# Patient Record
Sex: MALE | Race: WHITE | Employment: FULL TIME | URBAN - METROPOLITAN AREA
[De-identification: names, ages, dates, MRNs, and addresses within clinical notes are randomized per-mention and may not be internally consistent; named-entity substitution may affect disease eponyms.]

---

## 2017-03-27 PROBLEM — I10 ESSENTIAL HYPERTENSION: Status: ACTIVE | Noted: 2017-03-27

## 2020-06-24 PROBLEM — G47.33 OBSTRUCTIVE SLEEP APNEA: Status: ACTIVE | Noted: 2020-06-24

## 2020-11-02 ENCOUNTER — HOSPITAL ENCOUNTER (EMERGENCY)
Age: 50
Discharge: HOME | End: 2020-11-02
Attending: EMERGENCY MEDICINE
Payer: COMMERCIAL

## 2020-11-02 ENCOUNTER — APPOINTMENT (OUTPATIENT)
Dept: CT IMAGING | Age: 50
End: 2020-11-02
Payer: COMMERCIAL

## 2020-11-02 VITALS
WEIGHT: 274 LBS | SYSTOLIC BLOOD PRESSURE: 128 MMHG | HEART RATE: 82 BPM | RESPIRATION RATE: 17 BRPM | DIASTOLIC BLOOD PRESSURE: 89 MMHG | TEMPERATURE: 98.5 F | BODY MASS INDEX: 39.31 KG/M2 | OXYGEN SATURATION: 95 %

## 2020-11-02 LAB
ANION GAP SERPL CALCULATED.3IONS-SCNC: 12 MMOL/L (ref 7–16)
BACTERIA: ABNORMAL /HPF
BASOPHILS ABSOLUTE: 0.1 E9/L (ref 0–0.2)
BASOPHILS RELATIVE PERCENT: 0.8 % (ref 0–2)
BILIRUBIN URINE: NEGATIVE
BLOOD, URINE: ABNORMAL
BUN BLDV-MCNC: 18 MG/DL (ref 6–20)
CALCIUM SERPL-MCNC: 9.3 MG/DL (ref 8.6–10.2)
CHLORIDE BLD-SCNC: 102 MMOL/L (ref 98–107)
CLARITY: CLEAR
CO2: 23 MMOL/L (ref 22–29)
COLOR: YELLOW
CREAT SERPL-MCNC: 0.9 MG/DL (ref 0.7–1.2)
CRYSTALS, UA: ABNORMAL /HPF
EOSINOPHILS ABSOLUTE: 0.09 E9/L (ref 0.05–0.5)
EOSINOPHILS RELATIVE PERCENT: 0.7 % (ref 0–6)
GFR AFRICAN AMERICAN: >60
GFR NON-AFRICAN AMERICAN: >60 ML/MIN/1.73
GLUCOSE BLD-MCNC: 114 MG/DL (ref 74–99)
GLUCOSE URINE: NEGATIVE MG/DL
HCT VFR BLD CALC: 49.3 % (ref 37–54)
HEMOGLOBIN: 16.8 G/DL (ref 12.5–16.5)
IMMATURE GRANULOCYTES #: 0.08 E9/L
IMMATURE GRANULOCYTES %: 0.6 % (ref 0–5)
KETONES, URINE: NEGATIVE MG/DL
LEUKOCYTE ESTERASE, URINE: ABNORMAL
LYMPHOCYTES ABSOLUTE: 1.48 E9/L (ref 1.5–4)
LYMPHOCYTES RELATIVE PERCENT: 11.5 % (ref 20–42)
MCH RBC QN AUTO: 29.8 PG (ref 26–35)
MCHC RBC AUTO-ENTMCNC: 34.1 % (ref 32–34.5)
MCV RBC AUTO: 87.6 FL (ref 80–99.9)
MONOCYTES ABSOLUTE: 0.71 E9/L (ref 0.1–0.95)
MONOCYTES RELATIVE PERCENT: 5.5 % (ref 2–12)
NEUTROPHILS ABSOLUTE: 10.4 E9/L (ref 1.8–7.3)
NEUTROPHILS RELATIVE PERCENT: 80.9 % (ref 43–80)
NITRITE, URINE: POSITIVE
PDW BLD-RTO: 12.8 FL (ref 11.5–15)
PH UA: 8.5 (ref 5–9)
PLATELET # BLD: 210 E9/L (ref 130–450)
PMV BLD AUTO: 10.1 FL (ref 7–12)
POTASSIUM SERPL-SCNC: 4.6 MMOL/L (ref 3.5–5)
PROTEIN UA: 30 MG/DL
RBC # BLD: 5.63 E12/L (ref 3.8–5.8)
RBC UA: ABNORMAL /HPF (ref 0–2)
SODIUM BLD-SCNC: 137 MMOL/L (ref 132–146)
SPECIFIC GRAVITY UA: 1.01 (ref 1–1.03)
UROBILINOGEN, URINE: 0.2 E.U./DL
WBC # BLD: 12.9 E9/L (ref 4.5–11.5)
WBC UA: ABNORMAL /HPF (ref 0–5)

## 2020-11-02 PROCEDURE — 74176 CT ABD & PELVIS W/O CONTRAST: CPT

## 2020-11-02 PROCEDURE — 6360000002 HC RX W HCPCS: Performed by: EMERGENCY MEDICINE

## 2020-11-02 PROCEDURE — 99292 CRITICAL CARE ADDL 30 MIN: CPT

## 2020-11-02 PROCEDURE — 99283 EMERGENCY DEPT VISIT LOW MDM: CPT

## 2020-11-02 PROCEDURE — 6360000002 HC RX W HCPCS: Performed by: STUDENT IN AN ORGANIZED HEALTH CARE EDUCATION/TRAINING PROGRAM

## 2020-11-02 PROCEDURE — 80048 BASIC METABOLIC PNL TOTAL CA: CPT

## 2020-11-02 PROCEDURE — 87186 SC STD MICRODIL/AGAR DIL: CPT

## 2020-11-02 PROCEDURE — 96374 THER/PROPH/DIAG INJ IV PUSH: CPT

## 2020-11-02 PROCEDURE — 81001 URINALYSIS AUTO W/SCOPE: CPT

## 2020-11-02 PROCEDURE — 96375 TX/PRO/DX INJ NEW DRUG ADDON: CPT

## 2020-11-02 PROCEDURE — 85025 COMPLETE CBC W/AUTO DIFF WBC: CPT

## 2020-11-02 PROCEDURE — 99291 CRITICAL CARE FIRST HOUR: CPT

## 2020-11-02 PROCEDURE — 2580000003 HC RX 258: Performed by: EMERGENCY MEDICINE

## 2020-11-02 PROCEDURE — 87088 URINE BACTERIA CULTURE: CPT

## 2020-11-02 RX ORDER — LABETALOL HYDROCHLORIDE 5 MG/ML
10 INJECTION, SOLUTION INTRAVENOUS ONCE
Status: DISCONTINUED | OUTPATIENT
Start: 2020-11-02 | End: 2020-11-02

## 2020-11-02 RX ORDER — CEFDINIR 300 MG/1
300 CAPSULE ORAL 2 TIMES DAILY
Qty: 14 CAPSULE | Refills: 0 | Status: SHIPPED | OUTPATIENT
Start: 2020-11-02 | End: 2020-11-03 | Stop reason: SDUPTHER

## 2020-11-02 RX ORDER — KETOROLAC TROMETHAMINE 30 MG/ML
15 INJECTION, SOLUTION INTRAMUSCULAR; INTRAVENOUS ONCE
Status: COMPLETED | OUTPATIENT
Start: 2020-11-02 | End: 2020-11-02

## 2020-11-02 RX ADMIN — KETOROLAC TROMETHAMINE 15 MG: 30 INJECTION, SOLUTION INTRAMUSCULAR; INTRAVENOUS at 12:20

## 2020-11-02 RX ADMIN — CEFTRIAXONE SODIUM 1 G: 1 INJECTION, POWDER, FOR SOLUTION INTRAMUSCULAR; INTRAVENOUS at 12:51

## 2020-11-02 ASSESSMENT — ENCOUNTER SYMPTOMS
NAUSEA: 0
SHORTNESS OF BREATH: 0
DIARRHEA: 0
CHEST TIGHTNESS: 0
ABDOMINAL PAIN: 0
SORE THROAT: 0
BACK PAIN: 0
VOMITING: 0
COLOR CHANGE: 0

## 2020-11-02 ASSESSMENT — PAIN SCALES - GENERAL
PAINLEVEL_OUTOF10: 4
PAINLEVEL_OUTOF10: 4

## 2020-11-02 ASSESSMENT — PAIN DESCRIPTION - ORIENTATION: ORIENTATION: LEFT

## 2020-11-02 ASSESSMENT — PAIN DESCRIPTION - PAIN TYPE: TYPE: ACUTE PAIN

## 2020-11-02 ASSESSMENT — PAIN DESCRIPTION - LOCATION: LOCATION: FLANK

## 2020-11-02 NOTE — ED PROVIDER NOTES
Yaz Lr is a 44 y.o. man with history of nephrolithiasis presents after going to his PCPs office this morning for left-sided flank pain, and UA was positive for RBCs. Onset of left-sided flank pain, was evening priority and associated with difficulty urinating, and dysuria. This morning he felt some pressure in his urethra while urinating and saw a \"stone-like mass\" in the toilet. Flank pain has been stable, 4 out of 10, sharp, last for several seconds at a time. Not taken anything for the pain. No fevers, chills, N/V, chest pain, shortness of breath. Patient has a medical history as listed below:   Past Medical History:   Diagnosis Date    Asthma     CHILDHOOD    Kidney stones     Migraine     Obesity      Patient Active Problem List    Diagnosis Date Noted    Obstructive sleep apnea 06/24/2020    Essential hypertension 03/27/2017    Migraine 04/18/2016    Nephrolithiasis 02/01/2016    Class 2 obesity due to excess calories without serious comorbidity with body mass index (BMI) of 38.0 to 38.9 in adult 02/01/2016       Review of Systems   Constitutional: Negative for activity change, chills and fatigue. HENT: Negative for congestion and sore throat. Respiratory: Negative for chest tightness and shortness of breath. Cardiovascular: Negative for chest pain, palpitations and leg swelling. Gastrointestinal: Negative for abdominal pain, diarrhea, nausea and vomiting. Genitourinary: Positive for decreased urine volume, dysuria, flank pain and hematuria. Negative for penile pain, penile swelling, scrotal swelling, testicular pain and urgency. Musculoskeletal: Negative for arthralgias and back pain. Skin: Negative for color change and pallor. Neurological: Negative for light-headedness and numbness. Psychiatric/Behavioral: Negative for agitation and confusion. Physical Exam  Vitals signs reviewed. Constitutional:       Appearance: Normal appearance.    HENT: Head: Normocephalic and atraumatic. Eyes:      Extraocular Movements: Extraocular movements intact. Pupils: Pupils are equal, round, and reactive to light. Cardiovascular:      Rate and Rhythm: Normal rate. Pulses: Normal pulses. Heart sounds: Normal heart sounds. Pulmonary:      Effort: Pulmonary effort is normal.      Breath sounds: Normal breath sounds. Abdominal:      General: Abdomen is flat. Bowel sounds are normal. There is no distension. Palpations: Abdomen is soft. Tenderness: There is no abdominal tenderness. There is right CVA tenderness and left CVA tenderness. There is no guarding. Skin:     Capillary Refill: Capillary refill takes less than 2 seconds. Neurological:      General: No focal deficit present. Mental Status: He is alert and oriented to person, place, and time. Psychiatric:         Mood and Affect: Mood normal.         Behavior: Behavior normal.        Procedures     MDM  Number of Diagnoses or Management Options  Calculus of left kidney: established and improving  Ureterocele: established and improving  Urinary tract infection with hematuria, site unspecified: established and improving  Diagnosis management comments: 57-year-old man with history of nephrolithiasis presents for left-sided flank pain. UA positive for RBCs, WBCs, crystals and CT abdomen pelvis revealed punctuate nonobstructing left renal calculus with no hydronephrosis, ureterocele. He was given Toradol in the ER, 1 dose of Rocephin his symptoms minimally improved with this. This likely his symptoms are 2/2 calculus left kidney, ureterocele, and UTI. Patient remained hemodynamically stable throughout his ED course and he was discharged home with a course of [address] for an uncomplicated UTI. Instructed to follow-up with his PCP within the week. Advised the patient to return for worsening of his symptoms, high fevers.        Amount and/or Complexity of Data Reviewed  Clinical lab tests: ordered and reviewed  Tests in the radiology section of CPT®: ordered and reviewed  Tests in the medicine section of CPT®: reviewed and ordered    Risk of Complications, Morbidity, and/or Mortality  Presenting problems: moderate  Diagnostic procedures: low  Management options: low    Critical Care  Total time providing critical care: > 105 minutes    Patient Progress  Patient progress: improved       CT ABDOMEN PELVIS WO CONTRAST   Final Result   Significant left-sided hydrocele with some fluid dissecting left inguinal   canal.  Recommend scrotal ultrasound. Significant diffuse fatty infiltration liver. Punctate nonobstructing left renal calculus. There is no hydronephrosis. Subtle dilatation of the very distal left ureter could rule related to   ureterocele. There is moderate diffuse bladder wall thickening. Significant left-sided colonic diverticulosis without diverticulitis. Multilevel canal stenosis in the upper and mid lumbar spine             Patient presents to the ED for evaluation. This patient was seen and evaluated with the attending. Work-up was performed with concerns for but not limited to Kidney stone, urinary tract infection    Patient continues to be non-toxic on re-evaluation. Findings were discussed with the patient and reasons to immediately return to the ED were articulated to them. They will follow-up with their PMD.      EKG: normal EKG, normal sinus rhythm.        Oscar Cervantes MD  Resident  11/02/20 [id number]

## 2020-11-02 NOTE — ED PROVIDER NOTES
ATTENDING PROVIDER ATTESTATION:     Quinn Salcedo presented to the emergency department for evaluation of Flank Pain (left side)   and was initially evaluated by the Medical Resident. See Original ED Note for H&P and ED course above. I have reviewed and discussed the case, including pertinent history (medical, surgical, family and social) and exam findings with the Medical Resident assigned to Quinn Salcedo. I have personally performed and/or participated in the history, exam, medical decision making, and procedures and agree with all pertinent clinical information and any additional changes or corrections are noted below in my assessment and plan. I have discussed this patient in detail with the resident, and provided the instruction and education,       I have reviewed my findings and recommendations with the assigned Medical Resident, Quinn Salcedo and members of family present at the time of disposition. I have performed a history and physical examination of this patient and directly supervised the resident caring for this patient      History of Present Illness:    Presents to the ED for right flank pain and concerns for kidney stone, beginning prior to arrival.  The complaint has been persistent, moderate in severity, and worsened by nothing. Patient is concerned he has a kidney stone. He reports he had left flank pain with some dysuria and hematuria. He reports that he passed what he thinks was a small kidney stone this morning. History of kidney stones in the past.  No fever but has UTI symptoms. He continues to have dysuria. He denies other complaints. He denies nausea or vomiting. No diarrhea. Still making urine.   He denies any testicular pain or swelling or tenderness        Review of Systems:   A complete review of systems was performed and pertinent positives and negatives are stated within HPI, all other systems reviewed and are negative.    --------------------------------------------- PAST HISTORY ---------------------------------------------  Past Medical History:  has a past medical history of Asthma, Kidney stones, Migraine, and Obesity. Past Surgical History:  has a past surgical history that includes Kidney stone surgery; Bladder surgery; and Tonsillectomy. Social History:  reports that he has never smoked. He has never used smokeless tobacco. He reports current alcohol use. He reports that he does not use drugs. Family History: family history includes Diabetes in his mother and sister; Heart Disease in his father and mother. Unless otherwise noted, family history is non contributory    The patients home medications have been reviewed. Allergies: Alupent [metaproterenol]      Physical Exam:  Constitutional/General: Alert and oriented x3  Head: Normocephalic and atraumatic  Eyes: PERRL, EOMI, sclera non icteric  Mouth: Oropharynx clear, handling secretions  Neck: Supple, full ROM, no stridor, no meningeal signs  Respiratory: Lungs clear to auscultation bilaterally, no wheezes, rales, or rhonchi. Not in respiratory distress  Cardiovascular:  Regular rate. Regular rhythm. No murmurs, no aortic murmurs, no gallops, no rubs. 2+ distal pulses. Equal extremity pulses. GI:  Abdomen Soft, Non tender, Non distended. No rebound, guarding, or rigidity. No pulsatile masses. Mild left CVA tenderness. Musculoskeletal: Moves all extremities x 4. Warm and well perfused,  no clubbing, no cyanosis, no edema. Palpable peripheral pulses  Integument: skin warm and dry. No rashes.    Neurologic: GCS 15, no focal deficits  Psychiatric: Normal Affect      I directly supervised any procedures performed by the resident and was present for the procedure including all critical portions of the procedure    I, Dr. Van Oseguera, am the primary provider of record    My Medical Decision Making:         UTI  No fever  Still making urine  Pain improved, nontoxic-appearing        1. Urinary tract infection with hematuria, site unspecified    2. Calculus of left kidney    3.  Ureterocele           Patricio Ruiz MD  11/02/20 2076

## 2020-11-02 NOTE — ED NOTES
FIRST PROVIDER CONTACT ASSESSMENT NOTE      Department of Emergency Medicine   ED  First Provider Note   11/2/20  10:42 AM EST    Chief Complaint: No chief complaint on file. History of Present Illness:    Thalia Grace is a 44 y.o. male who presents to the ED by private car for left flank pain, started this morning, felt like he passed kidney stone this morning. Focused Screening Exam:  Constitutional:  Alert, appears stated age and is in no distress. Left flank pain, clear breath sounds b/l.      *ALLERGIES*     Alupent [metaproterenol]     ED Triage Vitals [11/02/20 1032]   BP Temp Temp src Pulse Resp SpO2 Height Weight   -- 97.1 °F (36.2 °C) -- 119 16 96 % -- --        Initial Plan of Care:  Initiate Treatment-Testing, Proceed toTreatment Area When Bed Available for ED Attending/MLP to Continue Care    -----------------END OF FIRST PROVIDER CONTACT ASSESSMENT NOTE--------------  Electronically signed by TONIE Titus   DD: 11/2/20       Jc Stringer [id number]  11/02/20 1043

## 2020-11-04 LAB
ORGANISM: ABNORMAL
URINE CULTURE, ROUTINE: ABNORMAL

## 2022-01-24 PROBLEM — N40.1 BENIGN PROSTATIC HYPERPLASIA WITH URINARY OBSTRUCTION: Status: ACTIVE | Noted: 2022-01-24

## 2022-01-24 PROBLEM — N13.8 BENIGN PROSTATIC HYPERPLASIA WITH URINARY OBSTRUCTION: Status: ACTIVE | Noted: 2022-01-24

## 2022-01-25 PROBLEM — R73.03 PREDIABETES: Status: ACTIVE | Noted: 2022-01-25

## 2022-01-25 PROBLEM — N40.0 BENIGN PROSTATIC HYPERPLASIA WITHOUT LOWER URINARY TRACT SYMPTOMS: Status: ACTIVE | Noted: 2022-01-24

## 2022-05-05 DIAGNOSIS — R30.0 DYSURIA: ICD-10-CM

## 2022-12-12 PROBLEM — K76.0 NAFL (NONALCOHOLIC FATTY LIVER): Status: ACTIVE | Noted: 2022-12-12

## 2023-01-07 ENCOUNTER — HOSPITAL ENCOUNTER (OUTPATIENT)
Dept: ULTRASOUND IMAGING | Age: 53
End: 2023-01-07
Payer: COMMERCIAL

## 2023-01-07 DIAGNOSIS — R14.0 ABDOMINAL BLOATING: ICD-10-CM

## 2023-01-07 PROCEDURE — 76700 US EXAM ABDOM COMPLETE: CPT

## 2023-03-22 ENCOUNTER — HOSPITAL ENCOUNTER (OUTPATIENT)
Age: 53
Discharge: HOME | End: 2023-03-22

## 2023-03-22 DIAGNOSIS — R73.03 PREDIABETES: ICD-10-CM

## 2023-03-22 LAB — HBA1C MFR BLD: 8.8 % (ref 4–5.6)

## 2023-03-22 PROCEDURE — 36415 COLL VENOUS BLD VENIPUNCTURE: CPT

## 2023-03-22 PROCEDURE — 83036 HEMOGLOBIN GLYCOSYLATED A1C: CPT

## 2023-08-31 ENCOUNTER — HOSPITAL ENCOUNTER (EMERGENCY)
Age: 53
Discharge: HOME | End: 2023-08-31
Attending: STUDENT IN AN ORGANIZED HEALTH CARE EDUCATION/TRAINING PROGRAM
Payer: COMMERCIAL

## 2023-08-31 ENCOUNTER — APPOINTMENT (OUTPATIENT)
Dept: CT IMAGING | Age: 53
End: 2023-08-31
Attending: STUDENT IN AN ORGANIZED HEALTH CARE EDUCATION/TRAINING PROGRAM
Payer: COMMERCIAL

## 2023-08-31 VITALS
HEART RATE: 75 BPM | TEMPERATURE: 98 F | RESPIRATION RATE: 18 BRPM | SYSTOLIC BLOOD PRESSURE: 135 MMHG | DIASTOLIC BLOOD PRESSURE: 83 MMHG | OXYGEN SATURATION: 97 %

## 2023-08-31 DIAGNOSIS — K57.32 DIVERTICULITIS OF COLON: Primary | ICD-10-CM

## 2023-08-31 LAB
ALBUMIN SERPL-MCNC: 4.9 G/DL (ref 3.5–5.2)
ALP SERPL-CCNC: 60 U/L (ref 40–129)
ALT SERPL-CCNC: 41 U/L (ref 0–40)
ANION GAP SERPL CALCULATED.3IONS-SCNC: 12 MMOL/L (ref 7–16)
AST SERPL-CCNC: 29 U/L (ref 0–39)
BASOPHILS # BLD: 0.08 K/UL (ref 0–0.2)
BASOPHILS NFR BLD: 1 % (ref 0–2)
BILIRUB SERPL-MCNC: 1 MG/DL (ref 0–1.2)
BUN SERPL-MCNC: 20 MG/DL (ref 6–20)
CALCIUM SERPL-MCNC: 9.7 MG/DL (ref 8.6–10.2)
CHLORIDE SERPL-SCNC: 101 MMOL/L (ref 98–107)
CO2 SERPL-SCNC: 26 MMOL/L (ref 22–29)
CREAT SERPL-MCNC: 0.9 MG/DL (ref 0.7–1.2)
EKG ATRIAL RATE: 73 BPM
EKG P AXIS: 62 DEGREES
EKG P-R INTERVAL: 160 MS
EKG Q-T INTERVAL: 394 MS
EKG QRS DURATION: 92 MS
EKG QTC CALCULATION (BAZETT): 434 MS
EKG R AXIS: 58 DEGREES
EKG T AXIS: -118 DEGREES
EKG VENTRICULAR RATE: 73 BPM
EOSINOPHIL # BLD: 0.14 K/UL (ref 0.05–0.5)
EOSINOPHILS RELATIVE PERCENT: 2 % (ref 0–6)
ERYTHROCYTE [DISTWIDTH] IN BLOOD BY AUTOMATED COUNT: 13.9 % (ref 11.5–15)
GFR SERPL CREATININE-BSD FRML MDRD: >60 ML/MIN/1.73M2
GLUCOSE SERPL-MCNC: 124 MG/DL (ref 74–99)
HCT VFR BLD AUTO: 48.9 % (ref 37–54)
HGB BLD-MCNC: 16.5 G/DL (ref 12.5–16.5)
IMM GRANULOCYTES # BLD AUTO: 0.03 K/UL (ref 0–0.58)
IMM GRANULOCYTES NFR BLD: 0 % (ref 0–5)
LACTATE BLDV-SCNC: 1.1 MMOL/L (ref 0.5–2.2)
LIPASE SERPL-CCNC: 50 U/L (ref 13–60)
LYMPHOCYTES NFR BLD: 1.72 K/UL (ref 1.5–4)
LYMPHOCYTES RELATIVE PERCENT: 20 % (ref 20–42)
MCH RBC QN AUTO: 29.5 PG (ref 26–35)
MCHC RBC AUTO-ENTMCNC: 33.7 G/DL (ref 32–34.5)
MCV RBC AUTO: 87.5 FL (ref 80–99.9)
MONOCYTES NFR BLD: 0.44 K/UL (ref 0.1–0.95)
MONOCYTES NFR BLD: 5 % (ref 2–12)
NEUTROPHILS NFR BLD: 72 % (ref 43–80)
NEUTS SEG NFR BLD: 6.28 K/UL (ref 1.8–7.3)
PLATELET # BLD AUTO: 208 K/UL (ref 130–450)
PMV BLD AUTO: 10.5 FL (ref 7–12)
POTASSIUM SERPL-SCNC: 4.3 MMOL/L (ref 3.5–5)
PROT SERPL-MCNC: 7.8 G/DL (ref 6.4–8.3)
RBC # BLD AUTO: 5.59 M/UL (ref 3.8–5.8)
SODIUM SERPL-SCNC: 139 MMOL/L (ref 132–146)
TROPONIN I SERPL HS-MCNC: 18 NG/L (ref 0–11)
TROPONIN I SERPL HS-MCNC: 20 NG/L (ref 0–11)
WBC OTHER # BLD: 8.7 K/UL (ref 4.5–11.5)

## 2023-08-31 PROCEDURE — 93005 ELECTROCARDIOGRAM TRACING: CPT | Performed by: STUDENT IN AN ORGANIZED HEALTH CARE EDUCATION/TRAINING PROGRAM

## 2023-08-31 PROCEDURE — 85025 COMPLETE CBC W/AUTO DIFF WBC: CPT

## 2023-08-31 PROCEDURE — 99284 EMERGENCY DEPT VISIT MOD MDM: CPT

## 2023-08-31 PROCEDURE — 84484 ASSAY OF TROPONIN QUANT: CPT

## 2023-08-31 PROCEDURE — 83690 ASSAY OF LIPASE: CPT

## 2023-08-31 PROCEDURE — 93010 ELECTROCARDIOGRAM REPORT: CPT | Performed by: INTERNAL MEDICINE

## 2023-08-31 PROCEDURE — 74176 CT ABD & PELVIS W/O CONTRAST: CPT

## 2023-08-31 PROCEDURE — 80053 COMPREHEN METABOLIC PANEL: CPT

## 2023-08-31 PROCEDURE — 6370000000 HC RX 637 (ALT 250 FOR IP): Performed by: STUDENT IN AN ORGANIZED HEALTH CARE EDUCATION/TRAINING PROGRAM

## 2023-08-31 PROCEDURE — 83605 ASSAY OF LACTIC ACID: CPT

## 2023-08-31 RX ORDER — AMOXICILLIN AND CLAVULANATE POTASSIUM 875; 125 MG/1; MG/1
1 TABLET, FILM COATED ORAL 2 TIMES DAILY
Qty: 20 TABLET | Refills: 0 | Status: SHIPPED | OUTPATIENT
Start: 2023-08-31 | End: 2023-09-10

## 2023-08-31 RX ORDER — AMOXICILLIN AND CLAVULANATE POTASSIUM 875; 125 MG/1; MG/1
1 TABLET, FILM COATED ORAL ONCE
Status: COMPLETED | OUTPATIENT
Start: 2023-08-31 | End: 2023-08-31

## 2023-08-31 RX ADMIN — AMOXICILLIN AND CLAVULANATE POTASSIUM 1 TABLET: 875; 125 TABLET, FILM COATED ORAL at 17:40

## 2023-08-31 ASSESSMENT — LIFESTYLE VARIABLES
HOW MANY STANDARD DRINKS CONTAINING ALCOHOL DO YOU HAVE ON A TYPICAL DAY: 1 OR 2
HOW OFTEN DO YOU HAVE A DRINK CONTAINING ALCOHOL: MONTHLY OR LESS

## 2023-08-31 ASSESSMENT — PAIN - FUNCTIONAL ASSESSMENT: PAIN_FUNCTIONAL_ASSESSMENT: 0-10

## 2023-09-01 NOTE — ED PROVIDER NOTES
[address] [facility]        Pt Name: Christa Roper  MRN: [id number]  [address] [facility] [place] 1970  Date of evaluation: 8/31/2023  Provider: Shawnee Rapp DO  PCP: Lenora Claudio MD  Note Started: 9:01 PM EDT 8/31/23    CHIEF COMPLAINT       Chief Complaint   Patient presents with    Back Pain     Was at chiropractor on the 24th when an adjustment technique caused injury to right flank, states that he felt a \"pop\" with pain and warmth. Now having difficulty with bowel movements and episode of emesis with nasuea    Flank Pain    Emesis       HISTORY OF PRESENT ILLNESS: 1 or more Elements            Christa Roper is a 52 y.o. male who presents for R flank pain. Last week, pt saw a chiropractor for the first time. During a manipulation procedure, pt felt a pop in his R flank with a warm sensation and focal pain 10/10. This morning, he had nausea and vomiting, as well as diarrhea. He has had 2 episodes of loose stool. He denies blood in stool or vomit. Admits to numbness in R lateral thigh that is not new onset. Denies HA, chest pain, SOB, dysuria. He has hx of kidney stone and bladder surgery. Nursing Notes were all reviewed and agreed with or any disagreements were addressed in the HPI. REVIEW OF EXTERNAL NOTE :          Chart Review/External Note Review    Last Echo reviewed by Me:  No results found for: LVEF, LVEFMODE          Controlled Substance Monitoring:    Acute and Chronic Pain Monitoring:   No flowsheet data found. REVIEW OF SYSTEMS :      Positives and Pertinent negatives as per HPI.      SURGICAL HISTORY     Past Surgical History:   Procedure Laterality Date    BLADDER SURGERY      KIDNEY STONE SURGERY      TONSILLECTOMY         CURRENTMEDICATIONS       Discharge Medication List as of 8/31/2023  8:54 PM        CONTINUE these medications which have NOT CHANGED    Details   metFORMIN (GLUCOPHAGE) 500 sepsis or septic shock? No Exclusion criteria - the patient is NOT to be included for SEP-1 Core Measure due to: Infection is not suspected          Medical Decision Making/Differential Diagnosis:    CC/HPI Summary, Social Determinants of health, Records Reviewed, DDx, testing done/not done, ED Course, Reassessment, disposition considerations/shared decision making with patient, consults, disposition:          Medical Decision Making  Amount and/or Complexity of Data Reviewed  Labs: ordered. Radiology: ordered. ECG/medicine tests: ordered. Risk  Prescription drug management. 53 y/o  male w/ hx of kidney stone, DMT2, BPH, presents for R flank pain. Labs ordered: CBC, CMP, UA, Lactate, Lipase, Troponin. Imaging ordered: CT Abd/Pelvis WO. EKG also ordered. CT Abd/Pelvis WO suggests: 1) Acute diverticulitis of descending colon and proximal sigmoid; 2) Cystitis, correlation with AU is recommended. During visit, pt has not been able to urinate and wanted to leave despite not urinating. Pt is stable and comfortable, and decision was made to discharge. Rx prescribed at discharge: Augmentin 875-125mg po bid x 10 days. Pt will follow up with PMD.       CONSULTS:   None        PROCEDURES   Unless otherwise noted below, none       CRITICAL CARE TIME (.cct)   None        I am the Primary Clinician of Record. FINAL IMPRESSION      1.  Diverticulitis of colon          DISPOSITION/PLAN     DISPOSITION Decision To Discharge 08/31/2023 08:47:56 PM      PATIENT REFERRED TO:  Vijay Wright MD  [address] [facility] [phone]            DISCHARGE MEDICATIONS:  Discharge Medication List as of 8/31/2023  8:54 PM        START taking these medications    Details   amoxicillin-clavulanate (AUGMENTIN) 875-125 MG per tablet Take 1 tablet by mouth 2 times daily for 10 days, Disp-20 tablet, R-0Take w/ food or milkNormal             DISCONTINUED MEDICATIONS:  Discharge Medication

## 2023-09-01 NOTE — ED NOTES
Pt still has specimen cup for urine. Pt states he cannot urinate at this time.  This RN offered pt water and pt refused     Liudmila Rodriguez  08/31/23 2033

## 2024-12-20 ENCOUNTER — HOSPITAL ENCOUNTER (OUTPATIENT)
Age: 54
Discharge: HOME | End: 2024-12-20
Payer: COMMERCIAL

## 2024-12-20 DIAGNOSIS — R73.03 PREDIABETES: ICD-10-CM

## 2024-12-20 DIAGNOSIS — R53.83 OTHER FATIGUE: ICD-10-CM

## 2024-12-20 LAB
ALBUMIN SERPL-MCNC: 4.5 G/DL (ref 3.5–5.2)
ALP SERPL-CCNC: 59 U/L (ref 40–129)
ALT SERPL-CCNC: 54 U/L (ref 0–40)
ANION GAP SERPL CALCULATED.3IONS-SCNC: 11 MMOL/L (ref 7–16)
AST SERPL-CCNC: 28 U/L (ref 0–39)
BILIRUB SERPL-MCNC: 1.2 MG/DL (ref 0–1.2)
BUN SERPL-MCNC: 19 MG/DL (ref 6–20)
CALCIUM SERPL-MCNC: 9.4 MG/DL (ref 8.6–10.2)
CHLORIDE SERPL-SCNC: 100 MMOL/L (ref 98–107)
CO2 SERPL-SCNC: 29 MMOL/L (ref 22–29)
CREAT SERPL-MCNC: 0.9 MG/DL (ref 0.7–1.2)
GFR, ESTIMATED: >90 ML/MIN/1.73M2
GLUCOSE SERPL-MCNC: 209 MG/DL (ref 74–99)
HBA1C MFR BLD: 9 % (ref 4–5.6)
POTASSIUM SERPL-SCNC: 4.1 MMOL/L (ref 3.5–5)
PROT SERPL-MCNC: 7.1 G/DL (ref 6.4–8.3)
SODIUM SERPL-SCNC: 140 MMOL/L (ref 132–146)
TESTOST SERPL-MCNC: 228 NG/DL (ref 193–740)

## 2024-12-20 PROCEDURE — 36415 COLL VENOUS BLD VENIPUNCTURE: CPT

## 2024-12-20 PROCEDURE — 83036 HEMOGLOBIN GLYCOSYLATED A1C: CPT

## 2024-12-20 PROCEDURE — 80053 COMPREHEN METABOLIC PANEL: CPT

## 2024-12-20 PROCEDURE — 84403 ASSAY OF TOTAL TESTOSTERONE: CPT
